# Patient Record
Sex: FEMALE | Race: BLACK OR AFRICAN AMERICAN | NOT HISPANIC OR LATINO | Employment: STUDENT | URBAN - METROPOLITAN AREA
[De-identification: names, ages, dates, MRNs, and addresses within clinical notes are randomized per-mention and may not be internally consistent; named-entity substitution may affect disease eponyms.]

---

## 2022-02-23 ENCOUNTER — TELEPHONE (OUTPATIENT)
Dept: INTERNAL MEDICINE | Facility: CLINIC | Age: 22
End: 2022-02-23

## 2022-02-23 ENCOUNTER — TELEPHONE (OUTPATIENT)
Dept: INTERNAL MEDICINE | Facility: CLINIC | Age: 22
End: 2022-02-23
Payer: COMMERCIAL

## 2022-02-23 NOTE — TELEPHONE ENCOUNTER
Spoke to pt yesterday evening after she missed an appointment w/  for lack of sleep.  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     This lady cried during our conversation explaining she can't concentrate.  She wants to be in school but can't focus.  She was a straight A student but didn't finish strong last semester and concerned about failing.

## 2022-03-03 ENCOUNTER — OFFICE VISIT (OUTPATIENT)
Dept: INTERNAL MEDICINE | Facility: CLINIC | Age: 22
End: 2022-03-03
Payer: COMMERCIAL

## 2022-03-03 VITALS
DIASTOLIC BLOOD PRESSURE: 80 MMHG | WEIGHT: 142.63 LBS | HEART RATE: 72 BPM | RESPIRATION RATE: 16 BRPM | SYSTOLIC BLOOD PRESSURE: 110 MMHG | HEIGHT: 63 IN | BODY MASS INDEX: 25.27 KG/M2 | TEMPERATURE: 99 F

## 2022-03-03 DIAGNOSIS — F41.0 PANIC ATTACK: ICD-10-CM

## 2022-03-03 DIAGNOSIS — F31.9 BIPOLAR 1 DISORDER: Primary | ICD-10-CM

## 2022-03-03 DIAGNOSIS — F33.9 EPISODE OF RECURRENT MAJOR DEPRESSIVE DISORDER, UNSPECIFIED DEPRESSION EPISODE SEVERITY: ICD-10-CM

## 2022-03-03 PROCEDURE — 99203 PR OFFICE/OUTPT VISIT, NEW, LEVL III, 30-44 MIN: ICD-10-PCS | Mod: S$GLB,,,

## 2022-03-03 PROCEDURE — 99999 PR PBB SHADOW E&M-EST. PATIENT-LVL IV: CPT | Mod: PBBFAC,,,

## 2022-03-03 PROCEDURE — 99203 OFFICE O/P NEW LOW 30 MIN: CPT | Mod: S$GLB,,,

## 2022-03-03 PROCEDURE — 99999 PR PBB SHADOW E&M-EST. PATIENT-LVL IV: ICD-10-PCS | Mod: PBBFAC,,,

## 2022-03-03 RX ORDER — QUETIAPINE FUMARATE 100 MG/1
100 TABLET, FILM COATED ORAL NIGHTLY
Qty: 30 TABLET | Refills: 11 | Status: SHIPPED | OUTPATIENT
Start: 2022-03-03 | End: 2022-09-10 | Stop reason: SDUPTHER

## 2022-03-03 NOTE — PROGRESS NOTES
Subjective     Chief Complaint: anxiety    History of Present Illness:  Ms. Annie Lugo is a 21 y.o. female with depression and anxiety presents to clinic for troubles with sleep and anxiety. Patient states that she was diagnosed with anxiety and panic disorder in the past with some depression. She was told she might be Bipolar from other healthcare providers but did not have a formal diagnosis. She was previously prescribed zoloft, prozac, and xanax for depression and anxiety but it did not help.    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 She also states symptoms are worsening. She states she has been having sleep paralysis that occurs once per week for the last couple of weeks.     She has a family history of schizophrenia, depression, and OCD primarily on the paternal side.     Depression Patient Health Questionnaire 3/3/2022   Over the last two weeks how often have you been bothered by little interest or pleasure in doing things 3   Over the last two weeks how often have you been bothered by feeling down, depressed or hopeless 3   PHQ-2 Total Score 6     ph6 performed and on chart. 25    Review of Systems   Constitutional: Negative for chills and fever.   HENT: Negative for sore throat.    Respiratory: Negative for shortness of breath, wheezing and stridor.    Cardiovascular: Negative for chest pain, palpitations and leg swelling.   Gastrointestinal: Negative for abdominal pain, blood in stool, constipation, diarrhea, nausea and vomiting.   Genitourinary: Negative for dysuria, flank  "pain, frequency and hematuria.   Musculoskeletal: Negative for back pain, joint pain and neck pain.   Neurological: Negative for dizziness, focal weakness, weakness and headaches.   Endo/Heme/Allergies: Does not bruise/bleed easily.   Psychiatric/Behavioral: Positive for depression. Negative for hallucinations, memory loss, substance abuse and suicidal ideas. The patient is nervous/anxious. The patient does not have insomnia.        PAST HISTORY:     Past Medical History:   Diagnosis Date    Asthma        History reviewed. No pertinent surgical history.    Family History   Problem Relation Age of Onset    Asthma Mother     Depression Father     OCD Brother     Schizophrenia Paternal Aunt        Social History     Socioeconomic History    Marital status: Single   Tobacco Use    Smoking status: Never Smoker    Smokeless tobacco: Never Used   Substance and Sexual Activity    Alcohol use: Not Currently    Drug use: Never    Sexual activity: Not Currently       MEDICATIONS & ALLERGIES:     No current outpatient medications on file prior to visit.     No current facility-administered medications on file prior to visit.       Review of patient's allergies indicates:  Penicillin - rash    OBJECTIVE:     Vital Signs:  Vitals:    03/03/22 1342   BP: 110/80   BP Location: Left arm   Patient Position: Sitting   Pulse: 72   Resp: 16   Temp: 98.7 °F (37.1 °C)   Weight: 64.7 kg (142 lb 10.2 oz)   Height: 5' 3" (1.6 m)       Body mass index is 25.27 kg/m².     Physical Exam:  General:  Well developed, well nourished, no acute distress  Head: Normocephalic, atraumatic  Eyes: PERRL, EOMI, clear sclera  Throat: No posterior pharyngeal erythema or exudate, no tonsillar exudate  Neck: supple, normal ROM, no thyromegaly   CVS:  RRR, S1 and S2 normal, no murmurs, rubs, gallops, 2+ peripheral pulses  Resp:  Lungs clear to auscultation, no wheezes, rales, rhonchi, cough  GI:  Abdomen soft, non-tender, non-distended, normoactive " bowel sounds  MSK:  No muscle atrophy, cyanosis, peripheral edema   Skin:  No rashes, ulcers, erythema  Neuro:  CNII-XII grossly intact, no focal deficits noted  Psych:  Appropriate mood and affect, normal judgement    Laboratory  No new labs    Diagnostic Results:  no new labs    ASSESSMENT & PLAN:   Ms. Annie Lugo is a 21 y.o. female who presents today with depression and anxiety.       Bipolar 1 disorder  Episode of recurrent major depressive disorder, unspecified depression episode severity  Panic attack  Given history of MDD with some episodes of manic symptoms, likely patient has Bipolar disorder. She was interested in starting medication today. Will trial a low dose seroquel at night and titrate up as needed. Will follow with patient in a week or 2 to assess medication efficacy and will do an established visit at that time.   -     Ambulatory referral/consult to Psychiatry; Future  -     Ambulatory referral/consult to Psychology; Future  -     QUEtiapine (SEROQUEL) 100 MG Tab; Take 1 tablet (100 mg total) by mouth every evening.       RTC in 1-2 weeks    Case discussed with Dr Ambar Hackett MD  Internal Medicine PGY1  [facility]  [address] [place] [address] [id number] [phone]

## 2022-04-04 ENCOUNTER — OFFICE VISIT (OUTPATIENT)
Dept: PSYCHIATRY | Facility: CLINIC | Age: 22
End: 2022-04-04
Payer: COMMERCIAL

## 2022-04-04 DIAGNOSIS — F39 MOOD DISORDER: Primary | ICD-10-CM

## 2022-04-04 PROCEDURE — 90791 PR PSYCHIATRIC DIAGNOSTIC EVALUATION: ICD-10-PCS | Mod: 95,,, | Performed by: SOCIAL WORKER

## 2022-04-04 PROCEDURE — 90791 PSYCH DIAGNOSTIC EVALUATION: CPT | Mod: 95,,, | Performed by: SOCIAL WORKER
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
## 2022-04-28 ENCOUNTER — OFFICE VISIT (OUTPATIENT)
Dept: URGENT CARE | Facility: CLINIC | Age: 22
End: 2022-04-28
Payer: COMMERCIAL

## 2022-04-28 VITALS
DIASTOLIC BLOOD PRESSURE: 70 MMHG | RESPIRATION RATE: 16 BRPM | WEIGHT: 130 LBS | OXYGEN SATURATION: 98 % | HEIGHT: 63 IN | BODY MASS INDEX: 23.04 KG/M2 | SYSTOLIC BLOOD PRESSURE: 107 MMHG | HEART RATE: 73 BPM | TEMPERATURE: 98 F

## 2022-04-28 DIAGNOSIS — F32.A DEPRESSION, UNSPECIFIED DEPRESSION TYPE: ICD-10-CM

## 2022-04-28 DIAGNOSIS — G47.00 INSOMNIA, UNSPECIFIED TYPE: Primary | ICD-10-CM

## 2022-04-28 DIAGNOSIS — F41.9 ANXIETY: ICD-10-CM

## 2022-04-28 PROCEDURE — 99203 PR OFFICE/OUTPT VISIT, NEW, LEVL III, 30-44 MIN: ICD-10-PCS | Mod: S$GLB,,, | Performed by: NURSE PRACTITIONER

## 2022-04-28 PROCEDURE — 99203 OFFICE O/P NEW LOW 30 MIN: CPT | Mod: S$GLB,,, | Performed by: NURSE PRACTITIONER

## 2022-04-28 RX ORDER — TRAZODONE HYDROCHLORIDE 50 MG/1
50 TABLET ORAL NIGHTLY
Qty: 30 TABLET | Refills: 0 | Status: SHIPPED | OUTPATIENT
Start: 2022-04-28 | End: 2022-05-28

## 2022-04-28 RX ORDER — BUSPIRONE HYDROCHLORIDE 5 MG/1
5 TABLET ORAL 2 TIMES DAILY
Qty: 60 TABLET | Refills: 0 | Status: SHIPPED | OUTPATIENT
Start: 2022-04-28 | End: 2022-05-28
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
## 2022-10-17 ENCOUNTER — TELEPHONE (OUTPATIENT)
Dept: INTERNAL MEDICINE | Facility: CLINIC | Age: 22
End: 2022-10-17
Payer: COMMERCIAL

## 2022-10-18 ENCOUNTER — TELEPHONE (OUTPATIENT)
Dept: INTERNAL MEDICINE | Facility: CLINIC | Age: 22
End: 2022-10-18
Payer: COMMERCIAL

## 2022-10-18 NOTE — TELEPHONE ENCOUNTER
I recommend this patient get established with somebody who is taking new patients, perhaps Dr. Antunez    Please see if we can change her appointment tomorrow to Dr Antunez, or schedule her very soon with her    I am not taking new patients.  Also, she has depression and was previously seen in Psychiatry, I recommend that she scheduled a follow-up with psych as soon as possible

## 2022-11-01 ENCOUNTER — PATIENT MESSAGE (OUTPATIENT)
Dept: INTERNAL MEDICINE | Facility: CLINIC | Age: 22
End: 2022-11-01

## 2022-12-05 ENCOUNTER — HOSPITAL ENCOUNTER (EMERGENCY)
Facility: OTHER | Age: 22
Discharge: HOME | End: 2022-12-05
Attending: EMERGENCY MEDICINE
Payer: COMMERCIAL

## 2022-12-05 VITALS
HEIGHT: 63 IN | OXYGEN SATURATION: 99 % | WEIGHT: 136 LBS | SYSTOLIC BLOOD PRESSURE: 112 MMHG | TEMPERATURE: 98 F | DIASTOLIC BLOOD PRESSURE: 72 MMHG | HEART RATE: 99 BPM | RESPIRATION RATE: 16 BRPM | BODY MASS INDEX: 24.1 KG/M2

## 2022-12-05 DIAGNOSIS — S02.5XXA CLOSED FRACTURE OF TOOTH, INITIAL ENCOUNTER: Primary | ICD-10-CM

## 2022-12-05 DIAGNOSIS — K08.89 PAIN, DENTAL: ICD-10-CM

## 2022-12-05 LAB
B-HCG UR QL: NEGATIVE
CTP QC/QA: YES

## 2022-12-05 PROCEDURE — 81025 URINE PREGNANCY TEST: CPT | Performed by: EMERGENCY MEDICINE

## 2022-12-05 PROCEDURE — 25000003 PHARM REV CODE 250: Performed by: EMERGENCY MEDICINE

## 2022-12-05 PROCEDURE — 99284 EMERGENCY DEPT VISIT MOD MDM: CPT | Mod: 25

## 2022-12-05 RX ORDER — IBUPROFEN 600 MG/1
600 TABLET ORAL EVERY 6 HOURS PRN
Qty: 20 TABLET | Refills: 0 | Status: SHIPPED | OUTPATIENT
Start: 2022-12-05

## 2022-12-05 RX ORDER — LIDOCAINE HYDROCHLORIDE 20 MG/ML
5 SOLUTION OROPHARYNGEAL
Status: COMPLETED | OUTPATIENT
Start: 2022-12-05 | End: 2022-12-05

## 2022-12-05 RX ORDER — KETOROLAC TROMETHAMINE 10 MG/1
10 TABLET, FILM COATED ORAL
Status: COMPLETED | OUTPATIENT
Start: 2022-12-05 | End: 2022-12-05

## 2022-12-05 RX ORDER — ACETAMINOPHEN 500 MG
1000 TABLET ORAL EVERY 6 HOURS PRN
Qty: 50 TABLET | Refills: 0 | Status: SHIPPED | OUTPATIENT
Start: 2022-12-05

## 2022-12-05 RX ORDER — LIDOCAINE HYDROCHLORIDE 20 MG/ML
SOLUTION OROPHARYNGEAL
Qty: 100 ML | Refills: 0 | Status: SHIPPED | OUTPATIENT
Start: 2022-12-05

## 2022-12-05 RX ADMIN — LIDOCAINE HYDROCHLORIDE 5 ML: 20 SOLUTION ORAL; TOPICAL at 01:12

## 2022-12-05 RX ADMIN — KETOROLAC TROMETHAMINE 10 MG: 10 TABLET, FILM COATED ORAL at 12:12

## 2022-12-05 NOTE — DISCHARGE INSTRUCTIONS
Mrs. Lugo,    Thank you for letting me care for you today! It was nice meeting you, and I hope you feel better soon.   If you would like access to your chart and what was done today please utilize the [facility].   Please come back to [facility] for all of your future medical needs.    Our goal in the emergency department is to always give you outstanding care and exceptional service. You may receive a survey by mail or e-mail in the next week regarding your experience in our ED. We would greatly appreciate you completing and returning the survey. Your feedback provides us with a way to recognize our staff who give very good care and it helps us learn how to improve when your experience was below our aspiration of excellence.     Sincerely,    Musa Bruce MD  Board Certified Emergency Physician

## 2022-12-05 NOTE — Clinical Note
Zinaia Colin accompanied their caregiver to the emergency department on 12/5/2022. They may return to school on 12/05/2022.      If you have any questions or concerns, please don't hesitate to call.      Callie Mathias LPN

## 2022-12-05 NOTE — Clinical Note
Adonay Shaw accompanied their family member to the emergency department on 12/5/2022. They may return to school on 12/05/2022.      If you have any questions or concerns, please don't hesitate to call.      Callie Mathias LPN

## 2022-12-05 NOTE — ED PROVIDER NOTES
Encounter Date: 12/5/2022    SCRIBE #1 NOTE: I, Michi Preston, am scribing for, and in the presence of,  Musa Bruce MD.     History     Chief Complaint   Patient presents with    Dental Pain     Pt presents with c/o right sided dental pain x2 weeks. Pt denies fever and drainage. Pt states tooth broke 2 weeks ago. Pt has not seen dentist yet but has appointment later this week.     Time seen by provider: 12:10 AM    This is a 22 y.o. female who presents with complaint of right upper tooth pain after cracking a tooth 2 weeks ago. She states the pain was initially tolerable but has recently worsened and began to radiate up into her gums. The patient states she has had cavities before but has not cracked a tooth. The patient states she has an upcoming dentist appointment schedules.  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  She has use some Orajel Tylenol to help with her symptoms with minor improvement prompting her to come to the emergency department this evening because the persistent pain    The history is provided by the patient.   Review of patient's allergies indicates:   Allergen Reactions    Penicillin Hives, Nausea And Vomiting and Swelling     Past Medical History:   Diagnosis Date    Anxiety     Asthma      History reviewed. No pertinent surgical history.  Family History   Problem Relation Age of Onset    Asthma Mother     Depression Father     OCD Brother     Schizophrenia Paternal Aunt      Social History     Tobacco Use    Smoking status: Never    Smokeless tobacco: Never   Substance Use Topics    Alcohol use: Not Currently    Drug use: Never     Review of Systems  Constitutional-no fever  HEENT-no congestion, +right lower tooth pain  Eyes-no redness  Respiratory-no shortness of breath  Cardio-no chest pain  GI-no  abdominal pain  Endocrine-no cold intolerance  -no difficulty urinating  MSK-no myalgias  Skin-no rashes  Allergy-no environmental allergy  Neurologic- no headache  Hematology-no swollen nodes  Behavioral-no confusion      Physical Exam     Initial Vitals [12/05/22 0003]   BP Pulse Resp Temp SpO2   112/72 99 16 98.3 °F (36.8 °C) 99 %      MAP       --         Physical Exam    Nursing note and vitals reviewed.  HENT:   Mouth/Throat:         Constitutional: Well appearing, no distress.  Eyes: Conjunctivae normal.  ENT       Head: Normocephalic, atraumatic.       Nose: Normal external appearance        Mouth/Throat:  No trismus, braces in place,  Hematological/Lymphatic/Immunilogical: no visible lymphadenopathy   Cardiovascular: Normal rate,   Respiratory: Normal respiratory effort.   Gastrointestinal: non distended   Musculoskeletal: Normal range of motion in all extremities. No obvious deformities or swelling.  Neurologic: Alert, oriented. Normal speech and language. No gross focal neurologic deficits are appreciated.  Skin: Skin is warm, dry. No rash noted.  Psychiatric: Mood and affect are normal.     ED Course   Procedures  Labs Reviewed   POCT URINE PREGNANCY          Imaging Results    None          Medications   ketorolac tablet 10 mg (10 mg Oral Given 12/5/22 0045)   LIDOcaine HCl 2% oral solution 5 mL (5 mLs Oral Given 12/5/22 0100)     Medical Decision Making:   History:   Old Medical Records: I decided to obtain old medical records.  Old Records Summarized: records from clinic visits.  Differential Diagnosis:   Pulpitis, tooth fracture, dental abscess  Clinical Tests:   Lab Tests: Reviewed and Ordered  ED Management:  Right posterior most inferior molar is cracked, braces in place, likely pain related to tooth injury, will plan for symptom care outpatient follow-up returning case of worsening.  No evidence of trismus, uvula is midline, floor the mouth soft.        Scribe Attestation:   Scribe #1: I  performed the above scribed service and the documentation accurately describes the services I performed. I attest to the accuracy of the note.          Physician Attestation for Scribe: I, musa bruce, reviewed documentation as scribed in my presence, which is both accurate and complete.           Clinical Impression:   Final diagnoses:  [S02.5XXA] Closed fracture of tooth, initial encounter (Primary)  [K08.89] Pain, dental      ED Disposition Condition    Discharge Stable          ED Prescriptions       Medication Sig Dispense Start Date End Date Auth. Provider    LIDOcaine HCl 2% (LIDOCAINE VISCOUS) 2 % Soln by Mucous Membrane route every 3 (three) hours. 100 mL 12/5/2022 -- Musa Bruce MD    acetaminophen (TYLENOL) 500 MG tablet Take 2 tablets (1,000 mg total) by mouth every 6 (six) hours as needed. 50 tablet 12/5/2022 -- Musa Bruce MD    ibuprofen (ADVIL,MOTRIN) 600 MG tablet Take 1 tablet (600 mg total) by mouth every 6 (six) hours as needed for Pain. 20 tablet 12/5/2022 -- Musa Bruce MD          Follow-up Information       Follow up With Specialties Details Why Contact Info    [facility] - Emergency Dept Emergency Medicine Go to  As needed [address]  [facility] [address] [phone]             Musa Bruce MD  12/05/22 8163

## 2022-12-05 NOTE — ED TRIAGE NOTES
Pt c/o R sided dental pain x 2 weeks after breaking a tooth. Pt has appt to f/u with dentist this week .     LOC: The patient is alert, and oriented to self, place, time, and situation. Pt is calm and cooperative. Speech is appropriate and clear.      APPEARANCE: Patient resting comfortably in no acute distress.  Patient is clean and well groomed.     SKIN: The skin is warm and dry; color consistent with ethnicity, intact; no breakdown or bruising noted.      MUSCULOSKELETAL:  MAEAW; denies pain or weakness.      RESPIRATORY: Respirations are non-labored with normal effort and rate. Denies cough.      CARDIAC:  Normal rate; normotensive.  No peripheral edema noted. No complaints of chest pain at this time.      ABDOMEN: Pt denies abdominal pain, nausea, vomiting, diarrhea or constipation.     NEUROLOGIC: Follows commands;  Pt denies headache, lightheadedness or dizziness; denies loss of consciousness.

## 2022-12-17 DIAGNOSIS — F33.9 EPISODE OF RECURRENT MAJOR DEPRESSIVE DISORDER, UNSPECIFIED DEPRESSION EPISODE SEVERITY: ICD-10-CM

## 2022-12-17 DIAGNOSIS — F31.9 BIPOLAR 1 DISORDER: ICD-10-CM

## 2022-12-17 DIAGNOSIS — F41.0 PANIC ATTACK: ICD-10-CM

## 2022-12-21 RX ORDER — QUETIAPINE FUMARATE 100 MG/1
100 TABLET, FILM COATED ORAL NIGHTLY
Qty: 30 TABLET | Refills: 0 | Status: SHIPPED | OUTPATIENT
Start: 2022-12-21 | End: 2023-01-20

## 2022-12-21 NOTE — TELEPHONE ENCOUNTER
Sent in refill of seroquel medication for 30 days. Patient has not been seen in clinic since March 2022 - messaged patient that she needs to set up an appointment for follow up and will need to obtain EKG at that time to assess QT interval.    Teresa Mclaughlin MD  PGY-2, Internal Medicine

## 2025-02-25 ENCOUNTER — OFFICE VISIT (OUTPATIENT)
Dept: INTERNAL MEDICINE | Facility: CLINIC | Age: 25
End: 2025-02-25

## 2025-02-25 VITALS
WEIGHT: 156.75 LBS | BODY MASS INDEX: 27.77 KG/M2 | SYSTOLIC BLOOD PRESSURE: 118 MMHG | HEART RATE: 96 BPM | DIASTOLIC BLOOD PRESSURE: 76 MMHG | OXYGEN SATURATION: 99 % | HEIGHT: 63 IN

## 2025-02-25 DIAGNOSIS — F42.9 OBSESSIVE-COMPULSIVE DISORDER, UNSPECIFIED TYPE: ICD-10-CM

## 2025-02-25 DIAGNOSIS — F32.2 SEVERE DEPRESSION: Primary | ICD-10-CM

## 2025-02-25 PROCEDURE — 99214 OFFICE O/P EST MOD 30 MIN: CPT | Mod: PBBFAC | Performed by: STUDENT IN AN ORGANIZED HEALTH CARE EDUCATION/TRAINING PROGRAM

## 2025-02-25 PROCEDURE — 99999 PR PBB SHADOW E&M-EST. PATIENT-LVL IV: CPT | Mod: PBBFAC,,, | Performed by: STUDENT IN AN ORGANIZED HEALTH CARE EDUCATION/TRAINING PROGRAM

## 2025-02-25 PROCEDURE — 99214 OFFICE O/P EST MOD 30 MIN: CPT | Mod: S$PBB,,, | Performed by: STUDENT IN AN ORGANIZED HEALTH CARE EDUCATION/TRAINING PROGRAM

## 2025-02-25 RX ORDER — ESCITALOPRAM OXALATE 10 MG/1
10 TABLET ORAL DAILY
Qty: 45 TABLET | Refills: 1 | Status: SHIPPED | OUTPATIENT
Start: 2025-02-25 | End: 2026-02-25

## 2025-02-25 NOTE — PATIENT INSTRUCTIONS
Call to make an appointment within [facility] for psychiatry/psychology [phone]     [facility] [address], [address]   Phone: [phone]   You can email for an appointment at: [email or web address]     [facility]: [phone]     Other psychiatrists:   Juany Hale(psychiatrist) [address] Phone: [phone]   Jorge L Powers (psychiatrist) [phone], [phone] [address] [place] [address]   Dr. Darwin Grove - [phone]   Dr. Ashley Villafana - [phone]     Therapy/Psychology:   You can try anyone of these number to see if your insurance is accepted or you would have to call your insurance.     [facility] ([facility]   Address: [facility] [place] [address]   Phone: [phone]   [email or web address]     [facility]   [address] [place] [address], [phone]   [email or web address]   Dr. Latasha Lima, [phone] or [email or web address]   Dr. Gisele Blankenship, [phone] or [email or web address]     Mary Mata    LCSW (therapist) [phone]   [address] [place] [address]   Tana PEÑAW (therapist) [phone]   [address] [place] [address]   Samantha Israel LCSW (therapist) [phone]   [address] [place] [address]   TJ Powers         LCSW                    [phone]   Perry Price [phone] (therapist) [facility]   Babatunde Baron (therapist) [phone]  [address] (therapist) [phone] [address] [place] [address]     [facility] [phone]   [facility] [place] [address]     Employee Assistance Program (EAP)   Check through your employer's HR.     Online Therapist:     [email or web address]     Free Guided Meditations   [email or web address]   [email or web address]    [email or web address]
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
## 2025-03-26 ENCOUNTER — OFFICE VISIT (OUTPATIENT)
Dept: URGENT CARE | Facility: CLINIC | Age: 25
End: 2025-03-26

## 2025-03-26 VITALS
WEIGHT: 155.63 LBS | DIASTOLIC BLOOD PRESSURE: 87 MMHG | HEIGHT: 63 IN | BODY MASS INDEX: 27.57 KG/M2 | SYSTOLIC BLOOD PRESSURE: 131 MMHG | TEMPERATURE: 99 F | OXYGEN SATURATION: 98 % | RESPIRATION RATE: 18 BRPM | HEART RATE: 101 BPM

## 2025-03-26 DIAGNOSIS — F42.9 OBSESSIVE-COMPULSIVE DISORDER, UNSPECIFIED TYPE: Primary | ICD-10-CM

## 2025-03-26 DIAGNOSIS — F32.A DEPRESSION, UNSPECIFIED DEPRESSION TYPE: ICD-10-CM

## 2025-03-26 DIAGNOSIS — F41.9 ANXIETY: ICD-10-CM

## 2025-03-26 DIAGNOSIS — K58.2 IRRITABLE BOWEL SYNDROME WITH BOTH CONSTIPATION AND DIARRHEA: ICD-10-CM

## 2025-03-26 DIAGNOSIS — R20.2 PARESTHESIAS: ICD-10-CM

## 2025-03-26 LAB
ABSOLUTE EOSINOPHIL (OHS): 0.04 K/UL
ABSOLUTE MONOCYTE (OHS): 0.37 K/UL (ref 0.3–1)
ABSOLUTE NEUTROPHIL COUNT (OHS): 3.98 K/UL (ref 1.8–7.7)
ALBUMIN SERPL BCP-MCNC: 3.8 G/DL (ref 3.5–5.2)
ALP SERPL-CCNC: 94 UNIT/L (ref 40–150)
ALT SERPL W/O P-5'-P-CCNC: 11 UNIT/L (ref 10–44)
ANION GAP (OHS): 8 MMOL/L (ref 8–16)
AST SERPL-CCNC: 17 UNIT/L (ref 11–45)
BASOPHILS # BLD AUTO: 0.05 K/UL
BASOPHILS NFR BLD AUTO: 0.8 %
BILIRUB SERPL-MCNC: 0.2 MG/DL (ref 0.1–1)
BUN SERPL-MCNC: 10 MG/DL (ref 6–20)
CALCIUM SERPL-MCNC: 9.2 MG/DL (ref 8.7–10.5)
CHLORIDE SERPL-SCNC: 106 MMOL/L (ref 95–110)
CO2 SERPL-SCNC: 23 MMOL/L (ref 23–29)
CREAT SERPL-MCNC: 0.7 MG/DL (ref 0.5–1.4)
ERYTHROCYTE [DISTWIDTH] IN BLOOD BY AUTOMATED COUNT: 12.5 % (ref 11.5–14.5)
GFR SERPLBLD CREATININE-BSD FMLA CKD-EPI: >60 ML/MIN/1.73/M2
GLUCOSE SERPL-MCNC: 96 MG/DL (ref 70–110)
HCT VFR BLD AUTO: 41.3 % (ref 37–48.5)
HGB BLD-MCNC: 12.8 GM/DL (ref 12–16)
IMM GRANULOCYTES # BLD AUTO: 0.02 K/UL (ref 0–0.04)
IMM GRANULOCYTES NFR BLD AUTO: 0.3 % (ref 0–0.5)
LYMPHOCYTES # BLD AUTO: 1.9 K/UL (ref 1–4.8)
MAGNESIUM SERPL-MCNC: 1.9 MG/DL (ref 1.6–2.6)
MCH RBC QN AUTO: 26.1 PG (ref 27–50)
MCHC RBC AUTO-ENTMCNC: 31 G/DL (ref 32–36)
MCV RBC AUTO: 84 FL (ref 82–98)
NUCLEATED RBC (/100WBC) (OHS): 0 /100 WBC
PLATELET # BLD AUTO: 387 K/UL (ref 150–450)
PMV BLD AUTO: 10.1 FL (ref 9.2–12.9)
POTASSIUM SERPL-SCNC: 4.3 MMOL/L (ref 3.5–5.1)
PROT SERPL-MCNC: 8.1 GM/DL (ref 6–8.4)
RBC # BLD AUTO: 4.9 M/UL (ref 4–5.4)
RELATIVE EOSINOPHIL (OHS): 0.6 %
RELATIVE LYMPHOCYTE (OHS): 29.9 % (ref 18–48)
RELATIVE MONOCYTE (OHS): 5.8 % (ref 4–15)
RELATIVE NEUTROPHIL (OHS): 62.6 % (ref 38–73)
SODIUM SERPL-SCNC: 137 MMOL/L (ref 136–145)
TSH SERPL-ACNC: 3.36 UIU/ML (ref 0.4–4)
WBC # BLD AUTO: 6.36 K/UL (ref 3.9–12.7)

## 2025-03-26 PROCEDURE — 85025 COMPLETE CBC W/AUTO DIFF WBC: CPT | Performed by: INTERNAL MEDICINE

## 2025-03-26 PROCEDURE — 82247 BILIRUBIN TOTAL: CPT | Performed by: INTERNAL MEDICINE

## 2025-03-26 PROCEDURE — 84443 ASSAY THYROID STIM HORMONE: CPT | Performed by: INTERNAL MEDICINE

## 2025-03-26 PROCEDURE — 83735 ASSAY OF MAGNESIUM: CPT | Performed by: INTERNAL MEDICINE

## 2025-03-26 PROCEDURE — 99214 OFFICE O/P EST MOD 30 MIN: CPT | Mod: S$GLB,,, | Performed by: INTERNAL MEDICINE

## 2025-03-26 RX ORDER — LAMOTRIGINE 200 MG/1
200 TABLET ORAL DAILY
COMMUNITY

## 2025-03-26 RX ORDER — SERTRALINE HYDROCHLORIDE 100 MG/1
100 TABLET, FILM COATED ORAL DAILY
COMMUNITY

## 2025-03-26 NOTE — PROGRESS NOTES
"Subjective:      Patient ID: Annie Lugo is a 24 y.o. female.    Vitals:  height is 5' 3" (1.6 m) and weight is 70.6 kg (155 lb 10.3 oz). Her oral temperature is 99.4 °F (37.4 °C). Her blood pressure is 131/87 and her pulse is 101. Her respiration is 18 and oxygen saturation is 98%.     Chief Complaint: Constipation    This is a 24 y.o. female who presents today with a chief complaint of stress, constipation and numb legs that has been on and off for several weeks.. Pt states she is currently not constipated.    Constipation  This is a new problem. The current episode started 1 to 4 weeks ago. Associated symptoms include bloating.       Gastrointestinal:  Positive for constipation.   Skin:  Negative for erythema.      Objective:     Physical Exam   Constitutional: She is oriented to person, place, and time. She appears well-developed.  Non-toxic appearance. She does not appear ill. No distress.   HENT:   Head: Normocephalic and atraumatic.   Ears:   Right Ear: Tympanic membrane and external ear normal.   Left Ear: Tympanic membrane and external ear normal.   Nose: Nose normal.   Mouth/Throat: Oropharynx is clear and moist. No oropharyngeal exudate.   Eyes: Conjunctivae and EOM are normal. Pupils are equal, round, and reactive to light. Right eye exhibits no discharge. Left eye exhibits no discharge. No scleral icterus.   Neck: Neck supple.   Cardiovascular: Normal rate, regular rhythm and normal heart sounds.   No murmur heard.Exam reveals no gallop and no friction rub.   Pulmonary/Chest: Effort normal. No respiratory distress. She has no wheezes. She has no rales.   Abdominal: There is no abdominal tenderness. There is no rebound and no guarding.   Lymphadenopathy:     She has no cervical adenopathy.   Neurological: no focal deficit. She is alert, oriented to person, place, and time and at baseline. She displays no weakness. No sensory deficit. Coordination normal.   Skin: Skin is warm, dry, not diaphoretic and " no rash. Capillary refill takes less than 2 seconds. No erythema   Psychiatric: Her behavior is normal.   Nursing note and vitals reviewed.      Assessment:     1. Obsessive-compulsive disorder, unspecified type    2. Anxiety    3. Depression, unspecified depression type    4. Paresthesias    5. Irritable bowel syndrome with both constipation and diarrhea        Plan:       Obsessive-compulsive disorder, unspecified type    Anxiety    Depression, unspecified depression type    Paresthesias  -     CBC Auto Differential  -     Magnesium  -     Comprehensive Metabolic Panel  -     TSH    Irritable bowel syndrome with both constipation and diarrhea  -     CBC Auto Differential  -     Magnesium  -     Comprehensive Metabolic Panel  -     TSH  -     peppermint oiL 0.2 mL CpDR; Take 1 capsule by mouth 3 (three) times daily.  Dispense: 30 capsule; Refill: 0      Had long discussion with patient. She has many complaints today. Mainly she is concerned about weight fluctuations, GI issues (fluctuating between diarrhea/constipation), and some leg numbness on shins and feet at night. She has many psychiatric diagnoses and had a difficult time explaining to me the nature of her complaints. She is currently on lamotrigine, lexapro, zoloft. She was recently started on lexapro last month which was when her GI issues started. The other symptoms have been going on for over a year. Her exam was benign with a normal neuro exam. We discussed organic causes of her illness and the possibility that it may be physical manifestations of a psychiatric illness. This upset her but we discussed that this is a diagnosis of exclusion and does not mean that we will not work up her symptoms to try and get answers. We discussed that this would be a lengthy process that would probably be best coming from her PCP Dr. Francisco, however we would begin the work up. We discussed stopping the lexapro since this can cause Gi disturbances and will start on  peppermint oil to help with IBS symptoms. I will have the international department reach out to her to help her schedule a follow up with psychiatry and her PCP. We will call with lab results and see how she is doing later this week.

## 2025-03-26 NOTE — PATIENT INSTRUCTIONS
We will call with lab results.     Please make appointment to follow up with Dr. Francisco, your PCP to continue to address these issues. Alternatively, you can return here for questions or concerns, I do think seeing a primary care doctor regularly that will see you regularly is important.     Decrease lexapro to 5 mg once daily for one week then stop.     I think you are dealing with some IBS symptoms and have sent in peppermint oil capsules to help, this usually begins to help within a week.     I think it would also be best for you to set up an appointment with your psychiatrist to continue following you for your OCD, anxiety.     Please return here in the meantime with any issues. The international department will also call you to set up a meeting to continue helping you navigate the [facility] system.

## 2025-03-27 ENCOUNTER — TELEPHONE (OUTPATIENT)
Dept: URGENT CARE | Facility: CLINIC | Age: 25
End: 2025-03-27

## 2025-03-27 ENCOUNTER — RESULTS FOLLOW-UP (OUTPATIENT)
Dept: URGENT CARE | Facility: CLINIC | Age: 25
End: 2025-03-27

## 2025-04-01 ENCOUNTER — PATIENT MESSAGE (OUTPATIENT)
Dept: URGENT CARE | Facility: CLINIC | Age: 25
End: 2025-04-01

## 2025-04-07 DIAGNOSIS — F32.2 SEVERE DEPRESSION: Primary | ICD-10-CM

## 2025-04-09 RX ORDER — ESCITALOPRAM OXALATE 10 MG/1
10 TABLET ORAL DAILY
Qty: 90 TABLET | Refills: 3 | OUTPATIENT
Start: 2025-04-09 | End: 2026-04-09